# Patient Record
Sex: MALE | Race: WHITE | Employment: FULL TIME | URBAN - NONMETROPOLITAN AREA
[De-identification: names, ages, dates, MRNs, and addresses within clinical notes are randomized per-mention and may not be internally consistent; named-entity substitution may affect disease eponyms.]

---

## 2022-06-14 ENCOUNTER — OFFICE VISIT (OUTPATIENT)
Dept: PRIMARY CARE CLINIC | Age: 22
End: 2022-06-14

## 2022-06-14 VITALS
SYSTOLIC BLOOD PRESSURE: 120 MMHG | WEIGHT: 153 LBS | BODY MASS INDEX: 21.9 KG/M2 | DIASTOLIC BLOOD PRESSURE: 74 MMHG | TEMPERATURE: 99.3 F | HEIGHT: 70 IN | OXYGEN SATURATION: 98 % | HEART RATE: 105 BPM | RESPIRATION RATE: 18 BRPM

## 2022-06-14 DIAGNOSIS — J02.9 SORE THROAT: ICD-10-CM

## 2022-06-14 DIAGNOSIS — J36 TONSIL, ABSCESS: Primary | ICD-10-CM

## 2022-06-14 LAB — S PYO AG THROAT QL: NORMAL

## 2022-06-14 RX ORDER — PREDNISONE 20 MG/1
TABLET ORAL
Qty: 5 TABLET | Refills: 0 | Status: SHIPPED | OUTPATIENT
Start: 2022-06-14 | End: 2022-06-24

## 2022-06-14 RX ORDER — AMOXICILLIN AND CLAVULANATE POTASSIUM 875; 125 MG/1; MG/1
1 TABLET, FILM COATED ORAL 2 TIMES DAILY
Qty: 20 TABLET | Refills: 0 | Status: SHIPPED | OUTPATIENT
Start: 2022-06-14 | End: 2022-06-24

## 2022-06-14 ASSESSMENT — ENCOUNTER SYMPTOMS
CHANGE IN BOWEL HABIT: 0
TROUBLE SWALLOWING: 1
VOMITING: 0
SHORTNESS OF BREATH: 0
RHINORRHEA: 0
EYES NEGATIVE: 1
SINUS PAIN: 0
SORE THROAT: 1
ABDOMINAL PAIN: 0
NAUSEA: 0
FACIAL SWELLING: 0
VISUAL CHANGE: 0
DIARRHEA: 0
SWOLLEN GLANDS: 1
SINUS PRESSURE: 0
COUGH: 0
CHEST TIGHTNESS: 0

## 2022-06-14 NOTE — PROGRESS NOTES
[address]  Dept: [phone]    Elder Vásquez (:  2000) is a 24 y.o. [facility] patient, here for evaluation of the following chief complaint(s): Other (white patches, sore throat, hurts to swallow, started yesterday )      ASSESSMENT/PLAN:  1. Tonsil, abscess  -     cefTRIAXone (ROCEPHIN) 500 mg in lidocaine 1 % 1.43 mL IM Injection; 500 mg, IntraMUSCular, ONCE, On 22 at 1230, For 1 dose1 g vial - Add 2.1 mL of 1% lidocaine to make a concentration of 350 mg/mL. 500 mg vial - Add 1 mL of 1 % lidocaine to make a concentration of 350 mg/mL. 2. Sore throat  -     POCT rapid strep A    Additionally started on oral Augmentin for throat abscess. AVS reviewed for tonsil abscess concerns  thoroughly discussed evolving symptoms and seeking care if symptoms progress or worsen. With throat culture today was able to express purulent material from lesion. Pt decliined throat culture. Images scanned into chart of white purulent lesion    Strep negative x 2 today in office. Discussed condition with MAX:    OTC motrin or tylenol rotating for pain swelling and fever. Additional  instructions   If you have any trouble swallowing , drooling  Difficulty with airway or breathing seek emergent care. Salt water rinse and gargle 3 x day as directed to help with drainage in throat and debride baceria. Consider soft foods for a few days. , rinse mouth and throat after eating. rest  [facility] Return in about 2 days (around 2022). SUBJECTIVE/OBJECTIVE:  Pt presents with throat swelling, white patches in throat. He reports  bad breath and enlarged lymph nodes with neck soreness right neck. Symptoms of sore throat and left sided neck aching in throat yesterday, patches and lymph swelling today. Reports mild fever.   99.3 orally with no antipyretics. Denies any history of sexual contact or STI  denies any recent illness or recurring strep infections, malaise, chills nausea etc.       Pharyngitis  This is a new problem. The current episode started yesterday. The problem occurs constantly. The problem has been gradually worsening. Associated symptoms include a fever, myalgias, neck pain, a sore throat and swollen glands. Pertinent negatives include no abdominal pain, anorexia, arthralgias, change in bowel habit, chest pain, chills, congestion, coughing, diaphoresis, fatigue, headaches, joint swelling, nausea, numbness, rash, urinary symptoms, vertigo, visual change, vomiting or weakness. The symptoms are aggravated by drinking and eating. He has tried nothing for the symptoms. Review of Systems   Constitutional: Positive for fever. Negative for chills, diaphoresis and fatigue. HENT: Positive for sore throat and trouble swallowing. Negative for congestion, dental problem, drooling, ear discharge, ear pain, facial swelling, mouth sores, postnasal drip, rhinorrhea, sinus pressure and sinus pain. Eyes: Negative. Respiratory: Negative for cough, chest tightness and shortness of breath. Cardiovascular: Negative for chest pain. Gastrointestinal: Negative for abdominal pain, anorexia, change in bowel habit, diarrhea, nausea and vomiting. Genitourinary: Negative. Musculoskeletal: Positive for myalgias and neck pain. Negative for arthralgias, joint swelling and neck stiffness. Skin: Negative for rash. Allergic/Immunologic: Negative for environmental allergies. Neurological: Negative for dizziness, vertigo, weakness, numbness and headaches. Hematological: Positive for adenopathy. Physical Exam  Constitutional:       Appearance: He is not ill-appearing. HENT:      Head: Normocephalic.       Right Ear: Tympanic membrane, ear canal and external ear normal.      Left Ear: Tympanic membrane, ear canal and external ear normal.      Nose: No congestion. Mouth/Throat:      Lips: No lesions. Mouth: Mucous membranes are moist. No oral lesions. Dentition: Normal dentition. Palate: No lesions. Pharynx: Oropharyngeal exudate and posterior oropharyngeal erythema present. Tonsils: Tonsillar abscess present. 2+ on the right. Eyes:      General:         Right eye: No discharge. Left eye: No discharge. Conjunctiva/sclera: Conjunctivae normal.      Pupils: Pupils are equal, round, and reactive to light. Cardiovascular:      Rate and Rhythm: Normal rate and regular rhythm. Pulses: Normal pulses. Heart sounds: Normal heart sounds. Pulmonary:      Effort: Pulmonary effort is normal.      Breath sounds: Normal breath sounds. Abdominal:      Palpations: Abdomen is soft. Musculoskeletal:         General: Tenderness present. Cervical back: Normal range of motion. Tenderness present. No rigidity. Lymphadenopathy:      Head:      Right side of head: Tonsillar adenopathy present. Left side of head: Tonsillar adenopathy present. Cervical: Cervical adenopathy present. Skin:     Capillary Refill: Capillary refill takes 2 to 3 seconds. Coloration: Skin is pale. Neurological:      General: No focal deficit present. Mental Status: He is alert and oriented to person, place, and time. Psychiatric:         Behavior: Behavior normal.               Additional Information:    /74   Pulse (!) 105   Temp 99.3 °F (37.4 °C)   Resp 18   Ht 5' 10\" (1.778 m)   Wt 153 lb (69.4 kg)   SpO2 98%   BMI 21.95 kg/m²     An electronic signature was used to authenticate this note.     --MC Adames - CNP

## 2022-06-14 NOTE — PATIENT INSTRUCTIONS
OTC motrin or tylenol rotating for pain welling and fever. Follow instructions  If you have any rouble swallowing , drooling seek emergent care  Salt water rinse and gargle 3 x day  Consider soft foods for a few days  Follow up in 2 days with Andre.

## 2022-06-16 ENCOUNTER — OFFICE VISIT (OUTPATIENT)
Dept: PRIMARY CARE CLINIC | Age: 22
End: 2022-06-16

## 2022-06-16 VITALS
RESPIRATION RATE: 18 BRPM | TEMPERATURE: 97.9 F | OXYGEN SATURATION: 98 % | BODY MASS INDEX: 21.9 KG/M2 | HEART RATE: 88 BPM | WEIGHT: 153 LBS | HEIGHT: 70 IN

## 2022-06-16 DIAGNOSIS — J03.90 ACUTE TONSILLITIS, UNSPECIFIED ETIOLOGY: Primary | ICD-10-CM

## 2022-06-16 ASSESSMENT — ENCOUNTER SYMPTOMS
NAUSEA: 0
TROUBLE SWALLOWING: 0
SHORTNESS OF BREATH: 0
SORE THROAT: 1
COUGH: 0
EYE DISCHARGE: 0
VOMITING: 0
EYE REDNESS: 0
RHINORRHEA: 0
DIARRHEA: 0

## 2022-06-16 NOTE — PROGRESS NOTES
[address]  Dept: [phone]  Dept Fax: [phone]  Loc Appt: [phone]  Loc Fax: [phone]        CHIEF COMPLAINT       Chief Complaint   Patient presents with    Follow-up     here for a follow up, tonsil check from 06/14. patient started oral ATB, had rocephin injection on 06/14 as well. Nurses Notes reviewed and I agree except as noted in the HPI. HISTORY OF PRESENT ILLNESS   Osvaldo Schultz is a 24 y.o. male who presents for re-check of tonsillitis/abscess. Patient had some drainage expressed at last visit. He was given Rocephin as well as oral ATBx. Doing well with treatment. States improving. No additional complaints. REVIEW OF SYSTEMS     Review of Systems   Constitutional: Negative for chills, diaphoresis, fatigue and fever. HENT: Positive for sore throat. Negative for congestion, ear pain, rhinorrhea and trouble swallowing. Eyes: Negative for discharge and redness. Respiratory: Negative for cough and shortness of breath. Cardiovascular: Negative for chest pain. Gastrointestinal: Negative for diarrhea, nausea and vomiting. Genitourinary: Negative for decreased urine volume. Musculoskeletal: Negative for neck pain and neck stiffness. Skin: Negative for rash. Neurological: Negative for headaches. Hematological: Negative for adenopathy. Psychiatric/Behavioral: Negative for sleep disturbance. PAST MEDICAL HISTORY   History reviewed. No pertinent past medical history. SURGICAL HISTORY     Patient  has no past surgical history on file. CURRENT MEDICATIONS       Outpatient Medications Prior to Visit   Medication Sig Dispense Refill    amoxicillin-clavulanate (AUGMENTIN) 875-125 MG per tablet Take 1 tablet by mouth 2 times daily for 10 days 20 tablet 0    predniSONE (DELTASONE) 20 MG tablet Take 1 tabs daily for 5 days. Take with food.  5 tablet 0     No facility-administered medications prior to visit. ALLERGIES     Patient is has No Known Allergies. FAMILY HISTORY     Patient's family history is not on file. SOCIAL HISTORY     Patient  reports that he has never smoked. He has never used smokeless tobacco. He reports current alcohol use. He reports that he does not use drugs. PHYSICAL EXAM     VITALS   , Temp: 97.9 °F (36.6 °C), Heart Rate: 88, Resp: 18, SpO2: 98 %  Physical Exam  Vitals and nursing note reviewed. Constitutional:       General: He is not in acute distress. Appearance: Normal appearance. He is well-developed. He is not ill-appearing, toxic-appearing or diaphoretic. HENT:      Head: Normocephalic and atraumatic. Jaw: There is normal jaw occlusion. No trismus. Right Ear: Hearing, tympanic membrane, ear canal and external ear normal. No mastoid tenderness. No hemotympanum. Tympanic membrane is not perforated, erythematous or bulging. Left Ear: Hearing, tympanic membrane, ear canal and external ear normal. No mastoid tenderness. No hemotympanum. Tympanic membrane is not perforated, erythematous or bulging. Nose: Nose normal.      Mouth/Throat:      Mouth: Mucous membranes are moist.      Pharynx: Oropharynx is clear. Uvula midline. Posterior oropharyngeal erythema (bilateral tonsil) present. No pharyngeal swelling, oropharyngeal exudate or uvula swelling. Tonsils: No tonsillar exudate or tonsillar abscesses. 1+ on the right. 1+ on the left. Eyes:      General: No scleral icterus. Conjunctiva/sclera: Conjunctivae normal.   Neck:      Thyroid: No thyromegaly. Trachea: Trachea normal.   Cardiovascular:      Rate and Rhythm: Normal rate and regular rhythm. No extrasystoles are present. Chest Wall: PMI is not displaced. Heart sounds: Normal heart sounds. No murmur heard. No friction rub. No gallop.     Pulmonary:      Effort: Pulmonary effort is normal. No accessory muscle usage or respiratory distress. Breath sounds: Normal breath sounds. Chest:   Breasts:      Right: No supraclavicular adenopathy. Left: No supraclavicular adenopathy. Musculoskeletal:      Cervical back: Normal range of motion and neck supple. Lymphadenopathy:      Head:      Right side of head: No submental, submandibular, tonsillar, preauricular, posterior auricular or occipital adenopathy. Left side of head: No submental, submandibular, tonsillar, preauricular, posterior auricular or occipital adenopathy. Cervical: No cervical adenopathy. Upper Body:      Right upper body: No supraclavicular adenopathy. Left upper body: No supraclavicular adenopathy. Skin:     General: Skin is warm and dry. Coloration: Skin is not pale. Findings: No rash. Comments: Skin intact, warm and dry to touch, no rashes noted on exposed surfaces. Neurological:      Mental Status: He is alert and oriented to person, place, and time. He is not disoriented. Psychiatric:         Mood and Affect: Mood normal.         Behavior: Behavior is cooperative. DIAGNOSTIC RESULTS   Labs:No results found for this visit on 06/16/22. IMAGING:  No orders to display       No images are attached to the encounter or orders placed in the encounter. CLINICAL COURSE COURSE:     Vitals:    06/16/22 1349   Pulse: 88   Resp: 18   Temp: 97.9 °F (36.6 °C)   SpO2: 98%   Weight: 153 lb (69.4 kg)   Height: 5' 10\" (1.778 m)         PROCEDURES:  None  FINAL IMPRESSION      1. Acute tonsillitis, unspecified etiology         DISPOSITION/PLAN     Exam c/w tonsillitis. No abscess. Patient discharged in stable condition. Continue current treatment. Discharge instructions given by staff. PATIENT REFERRED TO:    Follow up with PCP prn. If any distress go to ER.     DISCHARGE MEDICATIONS:  New Prescriptions    No medications on file         Electronically signed by MC Manzo CNP on 6/16/2022 at 1:55 PM

## 2022-06-16 NOTE — PATIENT INSTRUCTIONS
to relieve throat pain.  Drink plenty of fluids. Fluids may help soothe an irritated throat. Drink warm or cool liquids (whichever feels better). These include tea, soup, and juice.  Do not smoke, and avoid secondhand smoke. Smoking can make tonsillitis worse. If you need help quitting, talk to your doctor about stop-smoking programs and medicines. These can increase your chances of quitting for good.  Use a vaporizer or humidifier to add moisture to your bedroom. Follow the directions for cleaning the machine.  Get plenty of rest.  When should you call for help? Call your doctor now or seek immediate medical care if:     Your pain gets worse on one side of your throat.      You have a new or higher fever.      You notice changes in your voice.      You have trouble opening your mouth.      You have any trouble breathing.      You have much more trouble swallowing.      You have a fever with a stiff neck or a severe headache.      You are sensitive to light or feel very sleepy or confused. Watch closely for changes in your health, and be sure to contact your doctor if:     You do not get better after 2 days. Where can you learn more? Go to https://efw-suhlpeLukkineb.Adimab. org and sign in to your [employer] account. Enter O754 in the [employer] box to learn more about \"Tonsillitis: Care Instructions. \"     If you do not have an account, please click on the \"Sign Up Now\" link. Current as of: September 8, 2021               Content Version: 13.2  © 2006-2022 [employer]. Care instructions adapted under license by [facility] ([facility]). If you have questions about a medical condition or this instruction, always ask your healthcare professional. [place] [address] any warranty or liability for your use of this information.